# Patient Record
Sex: MALE | Race: WHITE
[De-identification: names, ages, dates, MRNs, and addresses within clinical notes are randomized per-mention and may not be internally consistent; named-entity substitution may affect disease eponyms.]

---

## 2021-07-04 ENCOUNTER — HOSPITAL ENCOUNTER (EMERGENCY)
Dept: HOSPITAL 26 - MED | Age: 71
Discharge: ANOTHER HOSPITAL | End: 2021-07-04
Payer: MEDICARE

## 2021-07-04 VITALS — SYSTOLIC BLOOD PRESSURE: 122 MMHG | DIASTOLIC BLOOD PRESSURE: 60 MMHG

## 2021-07-04 VITALS — DIASTOLIC BLOOD PRESSURE: 62 MMHG | SYSTOLIC BLOOD PRESSURE: 123 MMHG

## 2021-07-04 VITALS — WEIGHT: 137 LBS | HEIGHT: 64 IN | BODY MASS INDEX: 23.39 KG/M2

## 2021-07-04 DIAGNOSIS — Z20.822: ICD-10-CM

## 2021-07-04 DIAGNOSIS — R55: Primary | ICD-10-CM

## 2021-07-04 DIAGNOSIS — E87.1: ICD-10-CM

## 2021-07-04 DIAGNOSIS — J18.9: ICD-10-CM

## 2021-07-04 DIAGNOSIS — Z79.899: ICD-10-CM

## 2021-07-04 LAB
ALBUMIN FLD-MCNC: 2.6 G/DL (ref 3.4–5)
ANION GAP SERPL CALCULATED.3IONS-SCNC: 12.3 MMOL/L (ref 8–16)
APAP SERPL-MCNC: < 0.5 UG/ML (ref 10–30)
APPEARANCE UR: CLEAR
AST SERPL-CCNC: 80 U/L (ref 15–37)
BARBITURATES UR QL SCN: NEGATIVE NG/ML
BASOPHILS # BLD AUTO: 0 K/UL (ref 0–0.22)
BASOPHILS NFR BLD AUTO: 0.5 % (ref 0–2)
BENZODIAZ UR QL SCN: NEGATIVE NG/ML
BILIRUB SERPL-MCNC: 0.4 MG/DL (ref 0–1)
BILIRUB UR QL STRIP: NEGATIVE
BUN SERPL-MCNC: 6 MG/DL (ref 7–18)
BZE UR QL SCN: NEGATIVE NG/ML
CANNABINOIDS UR QL SCN: NEGATIVE NG/ML
CHLORIDE SERPL-SCNC: 87 MMOL/L (ref 98–107)
CK MB SERPL-MCNC: 0.9 NG/ML (ref 0–3.6)
CK MB SERPL-RTO: 0.1 % (ref 0–2.5)
CO2 SERPL-SCNC: 21.3 MMOL/L (ref 21–32)
COLOR UR: YELLOW
CREAT SERPL-MCNC: 0.9 MG/DL (ref 0.6–1.3)
EOSINOPHIL # BLD AUTO: 0 K/UL (ref 0–0.4)
EOSINOPHIL NFR BLD AUTO: 0 % (ref 0–4)
ERYTHROCYTE [DISTWIDTH] IN BLOOD BY AUTOMATED COUNT: 13.3 % (ref 11.6–13.7)
GFR SERPL CREATININE-BSD FRML MDRD: 108 ML/MIN (ref 90–?)
GLUCOSE SERPL-MCNC: 114 MG/DL (ref 74–106)
GLUCOSE UR STRIP-MCNC: NEGATIVE MG/DL
HCT VFR BLD AUTO: 32.4 % (ref 36–52)
HGB BLD-MCNC: 11.5 G/DL (ref 12–18)
HGB UR QL STRIP: NEGATIVE
LEUKOCYTE ESTERASE UR QL STRIP: NEGATIVE
LYMPHOCYTES # BLD AUTO: 1 K/UL (ref 2–11.5)
LYMPHOCYTES NFR BLD AUTO: 13.2 % (ref 20.5–51.1)
MCH RBC QN AUTO: 36 PG (ref 27–31)
MCHC RBC AUTO-ENTMCNC: 36 G/DL (ref 33–37)
MCV RBC AUTO: 100 FL (ref 80–94)
MONOCYTES # BLD AUTO: 0.9 K/UL (ref 0.8–1)
MONOCYTES NFR BLD AUTO: 12.4 % (ref 1.7–9.3)
NEUTROPHILS # BLD AUTO: 5.5 K/UL (ref 1.8–7.7)
NEUTROPHILS NFR BLD AUTO: 73.9 % (ref 42.2–75.2)
NITRITE UR QL STRIP: NEGATIVE
OPIATES UR QL SCN: NEGATIVE NG/ML
PCP UR QL SCN: NEGATIVE NG/ML
PH UR STRIP: 6 [PH] (ref 5–9)
PLATELET # BLD AUTO: 335 K/UL (ref 140–450)
POTASSIUM SERPL-SCNC: 3.6 MMOL/L (ref 3.5–5.1)
RBC # BLD AUTO: 3.24 MIL/UL (ref 4.2–6.1)
SALICYLATES SERPL-MCNC: < 2.8 MG/DL (ref 2.8–20)
SODIUM SERPL-SCNC: 117 MMOL/L (ref 136–145)
WBC # BLD AUTO: 7.4 K/UL (ref 4.8–10.8)

## 2021-07-04 PROCEDURE — 84484 ASSAY OF TROPONIN QUANT: CPT

## 2021-07-04 PROCEDURE — 82550 ASSAY OF CK (CPK): CPT

## 2021-07-04 PROCEDURE — 70450 CT HEAD/BRAIN W/O DYE: CPT

## 2021-07-04 PROCEDURE — 93005 ELECTROCARDIOGRAM TRACING: CPT

## 2021-07-04 PROCEDURE — 81003 URINALYSIS AUTO W/O SCOPE: CPT

## 2021-07-04 PROCEDURE — 80305 DRUG TEST PRSMV DIR OPT OBS: CPT

## 2021-07-04 PROCEDURE — 82553 CREATINE MB FRACTION: CPT

## 2021-07-04 PROCEDURE — 96365 THER/PROPH/DIAG IV INF INIT: CPT

## 2021-07-04 PROCEDURE — 96361 HYDRATE IV INFUSION ADD-ON: CPT

## 2021-07-04 PROCEDURE — G0480 DRUG TEST DEF 1-7 CLASSES: HCPCS

## 2021-07-04 PROCEDURE — 85025 COMPLETE CBC W/AUTO DIFF WBC: CPT

## 2021-07-04 PROCEDURE — 87426 SARSCOV CORONAVIRUS AG IA: CPT

## 2021-07-04 PROCEDURE — 80053 COMPREHEN METABOLIC PANEL: CPT

## 2021-07-04 PROCEDURE — 71045 X-RAY EXAM CHEST 1 VIEW: CPT

## 2021-07-04 PROCEDURE — G0482 DRUG TEST DEF 15-21 CLASSES: HCPCS

## 2021-07-04 PROCEDURE — 36415 COLL VENOUS BLD VENIPUNCTURE: CPT

## 2021-07-04 PROCEDURE — 99291 CRITICAL CARE FIRST HOUR: CPT

## 2021-07-04 NOTE — NUR
Patient appears to be resting comfortably in bed. Vital Signs within normal 
limits. Respirations even and unlabored. PT DENIES PAIN OR DISCOMFORT AT THIS 
TIME. PT STATES "I FEEL REALLY GOOD." DENIES DIZZINESS. UPDATED PT REGARDING 
[facility] ETA 1 HOUR. WIFE AT BEDSIDE. PT VERBALIZED UNDERSTANDING.

## 2021-07-04 NOTE — NUR
71 Y/O MALE BIBA FROM HOME HERE FOR ALOC X1WEEK, PT IS A&OX2, REORIENTED TO 
PLACE. PER EMT PT FAMILY REPORTS THAT PT HAD FALLEN OUT OF CHAIR AND HAS BEEN 
MUMBLING/SLURRING WORDS AT HOME TODAY. EMT REPORTS PT DRINKS DAILY, 2-3 BEERS. 
PT STATES HE HAD 2 1/2 BEERS THIS MORNING PRIOR TO ARRIVAL. DENIES ANY PAIN AT 
THIS TIME.AMBULATES WITH ASSISTANCE. 



PMH: ANEMIA, STROKE, HIGH CHOLESTEROL, HTN



NKA

## 2021-07-04 NOTE — NUR
PT UTILIZED URINAL BEDSIDE. 350 CC CLEAR, YELLOW URINE NOTED. PT DENIES PAIN OR 
DISCOMFORT AT THIS TIME. IV TO L FA PATENT WITH POSITIVE BLOOD RETURN. WIFE AT 
BEDSIDE.

## 2021-07-04 NOTE — NUR
Patient to be transferred to [facility].  Is being transferred due to 
INSURANCE REQUEST.  Receiving facility has accepting physician and available 
space. ER physician has signed transfer form.  Patient or responsible party has 
agreed to transfer and signed form.  Patient belongings inventoried and will be 
sent with patient.  Copy of nursing notes, lab reports, EKG, Physicians Orders 
and X-rays to be sent with patient.  Report called to KRISS HANKINS at receiving 
facility.  [facility] ambulance service has been called for transfer.  ETA is 1930.

## 2022-04-06 ENCOUNTER — HOSPITAL ENCOUNTER (EMERGENCY)
Dept: HOSPITAL 26 - MED | Age: 72
End: 2022-04-06
Payer: MEDICARE

## 2022-04-06 VITALS — WEIGHT: 175 LBS | HEIGHT: 68 IN | BODY MASS INDEX: 26.52 KG/M2

## 2022-04-06 DIAGNOSIS — I46.9: Primary | ICD-10-CM

## 2022-04-06 DIAGNOSIS — J45.909: ICD-10-CM

## 2022-04-06 DIAGNOSIS — Z86.73: ICD-10-CM

## 2022-04-06 NOTE — NUR
called and spoke with miguelito from [employer]. case ID [id number]. will 
recieve call back in an hour for update

## 2022-04-06 NOTE — NUR
-------------------------------------------------------------------------------

            *** Note paola in ED - 04/06/22 at 1825 by [email or web address] ***            

-------------------------------------------------------------------------------

SPOKE MESSI Mondragon FROM [employer]. CALL BACK NUMBER IS 
[phone]. PER CORNER PATIENT IS A [employer] CASE, BUT GIVEN OKAY TO MOVE BODY 
IF NEEDED

## 2022-04-06 NOTE — NUR
-------------------------------------------------------------------------------

            *** Note paola in ED - 04/06/22 at 1824 by [email or web address] ***            

-------------------------------------------------------------------------------

SPOKE MESSI Wolf FROM [employer]. CALL BACK NUMBER IS 
[phone]. PER CORNER PATIENT IS A [employer] CASE, BUT GIVEN OKAY TO MOVE BODY 
IF NEEDED

## 2022-04-06 NOTE — NUR
SPOKE MESSI Wolf FROM [facility] OFFICER. CALL BACK NUMBER IS 
[phone]. PER CORNER PATIENT IS A [facility] CASE, BUT GIVEN OKAY TO MOVE BODY 
IF NEEDED. [facility] CASE ID [id number]